# Patient Record
Sex: FEMALE | Race: BLACK OR AFRICAN AMERICAN
[De-identification: names, ages, dates, MRNs, and addresses within clinical notes are randomized per-mention and may not be internally consistent; named-entity substitution may affect disease eponyms.]

---

## 2018-02-12 ENCOUNTER — HOSPITAL ENCOUNTER (OUTPATIENT)
Dept: HOSPITAL 89 - RAD | Age: 18
End: 2018-02-12
Attending: INTERNAL MEDICINE

## 2018-02-12 DIAGNOSIS — Z86.11: ICD-10-CM

## 2018-02-12 DIAGNOSIS — Z11.1: Primary | ICD-10-CM

## 2018-02-12 PROCEDURE — 71046 X-RAY EXAM CHEST 2 VIEWS: CPT

## 2018-02-12 NOTE — RADIOLOGY IMAGING REPORT
FACILITY: [facility] - [place] 

 

PATIENT NAME: Fawad Teresa

: 2000

MR: [id number]

V: [id number]

EXAM DATE: 

ORDERING PHYSICIAN: SHAHAB JORGE

TECHNOLOGIST: 

 

[place] [facility] [place]

Patient: Fawad Teresa

: 2000

MRN: [id number]

Visit/Account:[id number]

Date of Sevice:  2018

 

ACCESSION #: [id number]

 

CHEST PA AND LAT

 

HISTORY: Tuberculosis screening

 

COMPARISON: None

 

FINDINGS:

 

Cardiomediastinal contours: Normal

Lungs and pleura: Calcified granulomas within both lungs.  No consolidation.  No pleural effusion.

Bones/soft tissues: Normal

Other findings: None significant

 

IMPRESSION:

 

1.  Bilateral calcified granulomas.

2.  No evidence of active tuberculosis.

 

Report Dictated By: Santo Snow MD at 2018 12:49 PM

 

Report E-Signed By: Santo Snow MD  at 2018 12:50 PM

 

WSN:[email or web address]

## 2018-12-11 ENCOUNTER — HOSPITAL ENCOUNTER (EMERGENCY)
Dept: HOSPITAL 89 - ER | Age: 18
Discharge: HOME | End: 2018-12-11
Payer: COMMERCIAL

## 2018-12-11 VITALS — DIASTOLIC BLOOD PRESSURE: 69 MMHG | SYSTOLIC BLOOD PRESSURE: 100 MMHG

## 2018-12-11 DIAGNOSIS — R04.2: Primary | ICD-10-CM

## 2018-12-11 PROCEDURE — 86480 TB TEST CELL IMMUN MEASURE: CPT

## 2018-12-11 PROCEDURE — 71046 X-RAY EXAM CHEST 2 VIEWS: CPT

## 2018-12-11 PROCEDURE — 36415 COLL VENOUS BLD VENIPUNCTURE: CPT

## 2018-12-11 PROCEDURE — 99283 EMERGENCY DEPT VISIT LOW MDM: CPT

## 2018-12-11 NOTE — RADIOLOGY IMAGING REPORT
FACILITY: [facility] - [place] 

 

PATIENT NAME: Fawad Teresa

: 2000

MR: [id number]

V: [id number]

EXAM DATE: 

ORDERING PHYSICIAN: DEREK COSTA

TECHNOLOGIST: 

 

[place] [facility] [place]

Patient: Fawad Teresa

: 2000

MRN: [id number]

Visit/Account:[id number]

Date of Sevice: 2018

 

ACCESSION #: [id number]

 

Exam type: CHEST PA AND LAT

 

History: cough with bloody sputum, hx of TB exposure

 

Comparison: 2018.

 

Findings:

 

The lungs are free of acute effusions, infiltrates or edema.  Calcified granuloma again seen in the l
eft midlung field.  The cardiac silhouette is normal in size.  The trachea is in midline.

 

IMPRESSION:

 

1.  No acute cardiopulmonary process is seen.  Specifically no chest radiographic evidence of active 
tuberculosis

 

Calcified granuloma left midlung field again noted

 

Report Dictated By: Elisa Donnelly MD at 2018 1:49 PM

 

Report E-Signed By: Elisa Donnelly MD  at 2018 1:50 PM

 

WSN:HILARIO

## 2018-12-11 NOTE — ER REPORT
History and Physical


Time Seen By MD:  12:29


HPI/ROS


CHIEF COMPLAINT: Bloody sputum





HISTORY OF PRESENT ILLNESS: This is a 18-year-old female presents to the 


emergency Department for bloody sputum. Patient states over the last 6 days 


she's had a nonproductive cough, no fevers or chills. No nausea or vomiting. She


states that she had one episode today where she was coughing had a few small 


flecks of blood noted in her sputum. She states that she was born in [place] 


and was exposed to TB, she spoke with her mother and with her history she became


concerned and decided come in for further evaluation. Patient has no other 


complaints. No headaches, no rashes, no dysuria or gastrointestinal concerns.





REVIEW OF SYSTEMS:


Constitutional: No fever, no chills.


Eyes: No discharge.


ENT: No sore throat. 


Cardiovascular: No chest pain, no palpitations.


Respiratory: As above.


Gastrointestinal: No abdominal pain, no vomiting.


Genitourinary: No hematuria.


Musculoskeletal: No back pain.


Skin: No rashes.


Neurological: No headache.


Allergies:  


Coded Allergies:  


     codeine (Verified  Allergy, Intermediate, SWELLING, 18)


Home Meds


No Active Prescriptions or Reported Meds


Past Medical/Surgical History


The patient has a past medical and surgical history of tuberculosis, right knee 


surgery, oral surgery.


Reviewed Nurses Notes:  Yes


Constitutional





Vital Sign - Last 24 Hours








 18





 12:25


 


Temp 98.0


 


Pulse 96


 


Resp 16


 


B/P (MAP) 98/73


 


Pulse Ox 96


 


O2 Delivery Room Air








Physical Exam


   General Appearance: The patient is alert, has no immediate need for airway 


protection and no signs of toxicity. 


Eyes: Pupils equal and round no pallor or injection.


ENT, Mouth: Mucous membranes are moist. Mild erythema to the posterior 


oropharynx, no petechiae or blood noted.


Respiratory: There are no retractions, lungs are clear to auscultation.


Cardiovascular: Regular rate and rhythm. 


Gastrointestinal:  Abdomen is soft and non tender, no masses, bowel sounds 


normal.


Neurological: Alert and oriented 4. Moving all extremities. Following all 


commands. No focal neuro deficits.


Skin: Warm and dry, no rashes.


Musculoskeletal:  Neck is supple non tender.


      Extremities are nontender, nonswollen and have full range of motion.





DIFFERENTIAL DIAGNOSIS: After history and physical exam differential diagnosis 


was considered for pneumonia, bronchitis, viral syndrome, tuberculosis and 


esophageal irritation.





Medical Decision Making


Data Points


Laboratory





Hematology








Test


 18


13:26








Chemistry








Test


 18


13:26











EKG/Imaging


Imaging


Location: [facility]


Patient: Fawad Teresa


: 2000


MRN: [id number]


Visit/Account:[id number]


Date of Sevice: 2018


 


ACCESSION #: [id number]


 


Exam type: CHEST PA AND LAT


 


History: cough with bloody sputum, hx of TB exposure


 


Comparison: 2018.


 


Findings:


 


The lungs are free of acute effusions, infiltrates or edema.  Calcified 


granuloma again seen in the left midlung field.  The cardiac silhouette is 


normal in size.  The trachea is in midline.


 


IMPRESSION:


 


1.  No acute cardiopulmonary process is seen.  Specifically no chest 


radiographic evidence of active tuberculosis


 


Calcified granuloma left midlung field again noted


 


Report Dictated By: Elisa Donnelly MD at 2018 1:49 PM


 


Report E-Signed By: Elisa Donnelly MD  at 2018 1:50 PM


 


WSN:HILARIO





ED Course/Re-evaluation


ED Course


The patient was admitted to room. A history and physical were obtained. 


Different diagnoses were considered. The patient was placed in contact and 


respiratory isolation given her history of TB. A two-view chest x-ray was 


negative for any acute changes from her previous x-rays, specifically no current


findings of active TB. I did draw a "fear and cold, I did touch the patient 


about this and we will likely have the results within the next 5 days. My [place]


spicion for active TB is very low, I suspect this is more esophageal irritation 


as the patient is being coughing& upper respiratory type symptoms over the last 


6 days or so. The patient was discharged home. The patient will be contacted 


with the test results. I did give the patient 6 days off of work. I did 


recommend wearing a mask. Patient expressed understanding. The patient had no 


other questions or concerns at this time and was discharged home.


Decision to Disposition Date:  Dec 11, 2018


Decision to Disposition Time:  14:09





Depart


Departure


Latest Vital Signs





Vital Signs








  Date Time  Temp Pulse Resp B/P (MAP) Pulse Ox O2 Delivery O2 Flow Rate FiO2


 


18 12:25 98.0 96 16 98/73 96 Room Air  








Impression:  


   Primary Impression:  


   Hemoptysis


Condition:  Improved


Disposition:  HOME OR SELF-CARE


New Scripts


No Active Prescriptions or Reported Meds


Departure Forms:  ER Transition Record, Medications Reconciliation,    Off 


Work/School Form,    School or Work Release?:  Work


   Number of days to be released:  6


Patient Portal Information


Patient Instructions:  Hemoptysis (ED)





Additional Instructions:  


There were no concerning findings on your Xray today.


I believe your blood tinged sputum is a result of esophageal irritation from 


coughing over the past several days.


The blood test is something we send out, and will hopefully have the final 


results by the weekend. 


Please return to the ED for any other concerns or worsening symptoms. 


Get plenty of rest.


Drink plenty of water.











DEREK COSTA FNP-BC      Dec 11, 2018 12:29